# Patient Record
Sex: FEMALE | Race: BLACK OR AFRICAN AMERICAN | NOT HISPANIC OR LATINO
[De-identification: names, ages, dates, MRNs, and addresses within clinical notes are randomized per-mention and may not be internally consistent; named-entity substitution may affect disease eponyms.]

---
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
## 2024-06-06 ENCOUNTER — APPOINTMENT (OUTPATIENT)
Dept: PEDIATRIC ORTHOPEDIC SURGERY | Facility: CLINIC | Age: 19
End: 2024-06-06
Payer: COMMERCIAL

## 2024-06-06 DIAGNOSIS — M54.9 DORSALGIA, UNSPECIFIED: ICD-10-CM

## 2024-06-06 DIAGNOSIS — M22.2X1 PATELLOFEMORAL DISORDERS, RIGHT KNEE: ICD-10-CM

## 2024-06-06 DIAGNOSIS — M22.2X2 PATELLOFEMORAL DISORDERS, RIGHT KNEE: ICD-10-CM

## 2024-06-06 PROCEDURE — 99213 OFFICE O/P EST LOW 20 MIN: CPT

## 2024-06-06 NOTE — ASSESSMENT
[FreeTextEntry1] : Chronic Back pain Chronic bilateral knee pain/patellofemoral in nature  The history for today's visit was obtained from the child, as well as the parent. The child's history was unreliable alone due to age and therefore, the parent was used today as an independent historian. The above was discussed at length with mother and patient. The back pain is most likely muscular in nature and PT can be helpful in working on core and back conditioning. PT can also be helpful in decreasing pain due to patellofemoral tracking issues. THis is a chronic condition. Mother is requesting accommodations for school to allow for taking breaks during class, change of positions.  If there is no improvement with course of PT 4-6 weeks, she will f/u and further advanced imaging may be considered.  All questions answered. Parent in agreement with the plan. Sarah MILLAN, MPAS, PAC, have acted as a scribe and documented the above for Dr. Tyler.  The above documentation completed by the scribe is an accurate record of both my words and actions.  JPD

## 2024-06-06 NOTE — REASON FOR VISIT
[Follow Up] : a follow up visit [Patient] : patient [Mother] : mother [FreeTextEntry1] : back pain and knee pain

## 2024-06-06 NOTE — HISTORY OF PRESENT ILLNESS
[Stable] : stable [FreeTextEntry1] : 19 yo female presents with mother for evaluation of back pain and bilateral anterior knee pain. She states the back pain started this past semester at school. She states it worsened after hitting her back against a doorknob. Pain is localized to the mid to lower back. No radiation of pain. No numbness or tingling. No delphine or bladder incontinence. She has tried ice/warmth, NSAIDs and stretching without relief of the pain. The pain was not aggravated by anything. No night pain. She also has been c/o bilateral anterior knee pain for years. She has done PT in the past but no significant improvement. Pain also present with activity, stairs, seated, prolonged activity. No injury reported. No mechanical or instability symptoms.

## 2024-06-06 NOTE — REVIEW OF SYSTEMS
[Joint Pains] : arthralgias [Back Pain] : ~T back pain [Change in Activity] : no change in activity [Fever Above 102] : no fever [Rash] : no rash [Congestion] : no congestion [Feeding Problem] : no feeding problem [Limping] : no limping [Joint Swelling] : no joint swelling

## 2024-06-06 NOTE — PHYSICAL EXAM
[FreeTextEntry1] : GENERAL: alert, cooperative pleasant young woman in NAD SKIN: The skin is intact, warm, pink and dry over the area examined. EYES: Normal conjunctiva, normal eyelids and pupils were equal and round. ENT: normal ears, normal nose and normal lips. CARDIOVASCULAR: brisk capillary refill, but no peripheral edema. RESPIRATORY: The patient is in no apparent respiratory distress. They're taking full deep breaths without use of accessory muscles or evidence of audible wheezes or stridor without the use of a stethoscope. Normal respiratory effort. ABDOMEN: not examined NEUROLOGICAL:  5/5 motor strength in the main muscle groups of bilateral lower extremities, sensory intact in bilateral lower extremities, 2+/symmetrical deep tendon reflexes were present in bilateral knees and bilateral Achilles, abdominal deep tendon reflexes are symmetrical in all 4 quadrants.  Negative Babinski No clonus Gait without evidence of antalgia. Able to walk heels and toes without difficulty Visualized getting on and off the exam table with good coordination and balance. SPINE: mild asymmetry on forward bend. No tenderness to palpation. FUll ROM spine Mild discomfort with twist. No pain with hyperextension Neg SLR, neg mariam PA 20 degrees bilaterally Hips: full symmetrical ROM without tenderness elicited.  Knee: No effusion noted. No STS, erythema or warmth noted. Able to SLR without lag. Full range of motion of the knee. +patellofemoral crepitus with flexion noted. +compression test bilaterally No instability to varus/valgus stress.   Negative Joanna's, Lachman and Anterior/posterior drawer with firm endpoint. No joint line tenderness. Negative patella apprehension. positive patella grind test. Negative patella J-sign. No calf tenderness ankle: full ROM without instability to stress. No tenderness or STS.